# Patient Record
(demographics unavailable — no encounter records)

---

## 2025-01-08 NOTE — PHYSICAL EXAM
[Normal] : Gait: normal [Ballesteros's Sign] : negative Ballesteros's sign [Pronator Drift] : negative pronator drift [SLR] : negative straight leg raise [de-identified] : 5 out of 5 motor strength, sensation is intact and symmetrical full range of motion flexion extension and rotation, no palpatory tenderness full range of motion of hips knees shoulders and elbows (all four extremities), no atrophy, negative straight leg raise, no pathological reflexes, no swelling, normal ambulation, no apparent distress skin is intact, no palpable lymph nodes, no upper or lower extremity instability, alert and oriented x3 and normal mood. Normal finger-to nose test.   [de-identified] : XR AP Lat Lumbar 01/08/2025 - s/p L3-S1 laminectomy and L4-5 fusion -reviewed with patient.    LS SPINE XRAY WITH OBLIQUES   10/22/24  (Chely Ferris)  HISTORY: R26.2 Difficulty Walking M54.42 Lower back pain with left sciatica Frontal, lateral, oblique views of the lumbar spine are submitted. There is a posterior fusion and laminectomy at L4-L5 stabilized by bilateral rods and pedicle screws. Hardware is intact without evidence of loosening. There is a trace retrolisthesis and mild disc space narrowing at L4-L5. There are surgical clips in right upper quadrant. Vertebral body height is preserved. No aggressive appearing lytic or blastic osseous lesions are identified. There is limbus vertebra at L4. IMPRESSION: Status post posterior fusion and laminectomy at L4-L5. Hardware is intact without evidence of loosening. Trace retrolisthesis and mild disc space narrowing at L4-L5. If there are persistent symptoms MRI or CT can be obtained for further evaluation as clinically warranted.   XR AP Lat Lumbar 08/14/2024-L4-5 laminectomy and fusion-reviewed with patient.   XR AP Lat Cervical 05/09/2024 - facet joint arthritis -reviewed with patient.   XR AP Lat Lumbar 05/09/2024 - L3-S1 laminectomy and L4-5 fusion, adequate -reviewed with patient.    XR AP Lat Lumbar 03/06/2024 -L3-S1 laminectomy and L4-5 fusion, adequate- reviewed with patient.   XR AP Lat Lumbar 02/05/2024 -L3-S1 laminectomy and L4-5 fusion, adequate- reviewed with patient.

## 2025-01-08 NOTE — HISTORY OF PRESENT ILLNESS
[Improving] : improving [de-identified] : 56 year old female presents s/p L3-S1 laminectomy and L4-5 fusion on 1/23/24.  Complains of right lower back pain with certain movements and positions. States that her pre-operative leg pain has resolved.  Fell backwards 2 days ago onto back. Was referred to PT for the lumbar spine which she states has been helpful.  She complains of neck popping with forward flexion. She also had a fall on her head against a coffee table in April Has pain at base of the head. Takes tylenol for the pain.  She was referred to PT for the cervical spine at last visit. She went to the chiropractor instead, and had 1-2 visits since May, and she states she feels improvement.  Has been having increased falls due to issues with balance, is going to see a neurologist this week. She notes that she has increased back pain due to the fall, and has pain radiating down the LLE laterally to the foot.  No fever chills sweats nausea vomiting no bowel or bladder dysfunction, no recent weight loss or gain no night pain. This history is in addition to the intake form that I personally reviewed.

## 2025-01-08 NOTE — ADDENDUM
[FreeTextEntry1] : This note was written by Romana Desir on 01/08/2025 acting as scribe for Dr. Shmuel Chua M.D.  I, Shmuel Chua MD, have read and attest that all the information, medical decision making and discharge instructions within are true and accurate.

## 2025-01-08 NOTE — DISCUSSION/SUMMARY
[de-identified] : s/p L3-S1 laminectomy and L4-5 fusion on 1/23/24. Doing well, is happy with surgical results. Cervical facet joint arthritis. Lumbar sprain and strain. Discussed all options. Will see her neurologist Dr. Jurgen Sarah. Parkinson's. Referral for physical therapy. All options discussed including rest, medicine, home exercise, acupuncture, Chiropractic care, Physical Therapy, Pain management, and last resort surgery. All questions were answered, all alternatives discussed, and the patient is in complete agreement with the treatment plan which the patient contributed to and discussed with me through the shared decision-making process. Follow-up appointment as instructed. Any issues and the patient will call or come in sooner.  agrees with plan.

## 2025-05-22 NOTE — ADDENDUM
[FreeTextEntry1] : This note was written by Alexsander Whittington on 05/22/2025 acting as scribe for Dr. Shmuel Chua M.D.  I, Shmuel Chua MD, have read and attest that all the information, medical decision making and discharge instructions within are true and accurate.

## 2025-05-22 NOTE — DISCUSSION/SUMMARY
[de-identified] : s/p L3-S1 laminectomy and L4-5 fusion on 1/23/24. Cervical facet joint arthritis. Mid thoracic compression fracture- fell in March but had multiple falls after. Will see her neurologist Dr. Jurgen Sarah. Parkinson's. Discussed all options. Naproxen PRN. Thoracic MRI referral to assess compression fracture. F/U after MRI. All options discussed including rest, medicine, home exercise, acupuncture, Chiropractic care, Physical Therapy, Pain management, and last resort surgery. All questions were answered, all alternatives discussed, and the patient is in complete agreement with the treatment plan which the patient contributed to and discussed with me through the shared decision-making process. Follow-up appointment as instructed. Any issues and the patient will call or come in sooner. Family member agrees with plan.

## 2025-05-22 NOTE — HISTORY OF PRESENT ILLNESS
[Improving] : improving [de-identified] : 56 year old female presents s/p L3-S1 laminectomy and L4-5 fusion on 1/23/24.  Complains of right lower back pain with certain movements and positions. States that her pre-operative leg pain has resolved.  Was referred to PT for the lumbar spine which she states has been helpful.  She complains of neck popping with forward flexion. She also had a fall on her head against a coffee table in April Has pain at base of the head. Takes tylenol for the pain.  She was referred to PT for the cervical spine at last visit. She went to the chiropractor instead, and had 1-2 visits since May, and she states she feels improvement.  Has been having increased falls due to issues with balance, is seeing neurology.  She notes that she has increased back pain due to the fall, and has pain radiating down the LLE laterally to the foot.  She notes that she had a fall in March where she fell backwards and hit her head. She has been having mid and upper back pain since the fall. She went to [facility] and notes that she had MRI of her back done which she reports was negative. She notes that her pain has been persistent since the fall.  Ambulating with a rolling walker.  No fever chills sweats nausea vomiting no bowel or bladder dysfunction, no recent weight loss or gain no night pain. This history is in addition to the intake form that I personally reviewed.

## 2025-05-22 NOTE — PHYSICAL EXAM
[Walker] : ambulates with walker [Ballesteros's Sign] : negative Ballesteros's sign [Pronator Drift] : negative pronator drift [SLR] : negative straight leg raise [de-identified] : 5 out of 5 motor strength, sensation is intact and symmetrical full range of motion flexion extension and rotation, no palpatory tenderness full range of motion of hips knees shoulders and elbows (all four extremities), no atrophy, negative straight leg raise, no pathological reflexes, no swelling, normal ambulation, no apparent distress skin is intact, no palpable lymph nodes, no upper or lower extremity instability, alert and oriented x3 and normal mood. Normal finger-to nose test. no percussion tenderness.   [de-identified] : XR AP Lat Thoracic 05/22/2025 -mid thoracic compression fracture, mildly increased kyphosis- reviewed with patient.    XR AP Lat Lumbar 01/08/2025 - s/p L3-S1 laminectomy and L4-5 fusion -reviewed with patient.    LS SPINE XRAY WITH OBLIQUES   10/22/24  (Chely Ferris)  HISTORY: R26.2 Difficulty Walking M54.42 Lower back pain with left sciatica Frontal, lateral, oblique views of the lumbar spine are submitted. There is a posterior fusion and laminectomy at L4-L5 stabilized by bilateral rods and pedicle screws. Hardware is intact without evidence of loosening. There is a trace retrolisthesis and mild disc space narrowing at L4-L5. There are surgical clips in right upper quadrant. Vertebral body height is preserved. No aggressive appearing lytic or blastic osseous lesions are identified. There is limbus vertebra at L4. IMPRESSION: Status post posterior fusion and laminectomy at L4-L5. Hardware is intact without evidence of loosening. Trace retrolisthesis and mild disc space narrowing at L4-L5. If there are persistent symptoms MRI or CT can be obtained for further evaluation as clinically warranted.   XR AP Lat Lumbar 08/14/2024-L4-5 laminectomy and fusion-reviewed with patient.   XR AP Lat Cervical 05/09/2024 - facet joint arthritis -reviewed with patient.   XR AP Lat Lumbar 05/09/2024 - L3-S1 laminectomy and L4-5 fusion, adequate -reviewed with patient.    XR AP Lat Lumbar 03/06/2024 -L3-S1 laminectomy and L4-5 fusion, adequate- reviewed with patient.   XR AP Lat Lumbar 02/05/2024 -L3-S1 laminectomy and L4-5 fusion, adequate- reviewed with patient.

## 2025-07-09 NOTE — ADDENDUM
[FreeTextEntry1] : This note was written by Hakeem Castro on 07/09/2025 acting as scribe for Dr. Shmuel Chua M.D. I, Shmuel Chua MD, have read and attest that all the information, medical decision making and discharge instructions within are true and accurate.                       g

## 2025-07-09 NOTE — DISCUSSION/SUMMARY
[de-identified] : s/p L3-S1 laminectomy and L4-5 fusion on 1/23/24. Cervical facet joint arthritis. Mid thoracic compression fracture- fell in March but had multiple falls after. Will see her neurologist Dr. Jurgen Sarah. Parkinson's. Fell down the stairs 3/2025 T7 Subacute compression fractures. T4,8,9,10 compression fractures healing. Discussed all options. Gabapentin QHS. Medrol dosepak PRN. Referral for physical therapy in 1 month. All options discussed including rest, medicine, home exercise, acupuncture, Chiropractic care, Physical Therapy, Pain management, and last resort surgery. All questions were answered, all alternatives discussed, and the patient is in complete agreement with the treatment plan which the patient contributed to and discussed with me through the shared decision-making process. Follow-up appointment as instructed. Any issues and the patient will call or come in sooner. Family member agrees with plan.

## 2025-07-09 NOTE — HISTORY OF PRESENT ILLNESS
[Improving] : improving [de-identified] : 56 year old female presents s/p L3-S1 laminectomy and L4-5 fusion on 1/23/24.  Complains of right lower back pain with certain movements and positions. States that her pre-operative leg pain has resolved.  Was referred to PT for the lumbar spine which she states has been helpful.  She complains of neck popping with forward flexion. She also had a fall on her head against a coffee table in April Has pain at base of the head. Takes tylenol for the pain.  She was referred to PT for the cervical spine at last visit. She went to the chiropractor instead, and had 1-2 visits since May, and she states she feels improvement.  Has been having increased falls due to issues with balance, is seeing neurology.  She notes that she has increased back pain due to the fall, and has pain radiating down the LLE laterally to the foot.  She notes that she had a fall in March where she fell backwards and hit her head. She has been having mid and upper back pain since the fall. She went to [facility] and notes that she had MRI of her back done which she reports was negative. She notes that her pain has been persistent since the fall.  Presents today for MRI Thoracic review.  Ambulating with a rolling walker.  No fever chills sweats nausea vomiting no bowel or bladder dysfunction, no recent weight loss or gain no night pain. This history is in addition to the intake form that I personally reviewed.

## 2025-07-09 NOTE — PHYSICAL EXAM
[Walker] : ambulates with walker [Ballesteros's Sign] : negative Ballesteros's sign [Pronator Drift] : negative pronator drift [SLR] : negative straight leg raise [de-identified] : 5 out of 5 motor strength, sensation is intact and symmetrical full range of motion flexion extension and rotation, no palpatory tenderness full range of motion of hips knees shoulders and elbows (all four extremities), no atrophy, negative straight leg raise, no pathological reflexes, no swelling, normal ambulation, no apparent distress skin is intact, no palpable lymph nodes, no upper or lower extremity instability, alert and oriented x3 and normal mood. Normal finger-to nose test. no percussion tenderness.   [de-identified] : 06/05/2025 EXAM: MRI-THORACIC SPINE NON CONTRAST   (Chely Ferris)  HISTORY: M62.830 Spasm of back muscles M54.6 Thoracic Pain COMPARISON: Thoracic radiculopathy TECHNIQUE: Multiplanar, multisequence MRI of the thoracic spine without contrast was performed without administration of intravenous contrast. FINDINGS: Alignment: Alignment is preserved. 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 Middle and posterior columns are intact. No retropulsion of bone fragments. No epidural or paravertebral hematoma. Chronic anterior wedge compression fracture of T4, T8, T9 and T10. Degenerative disc disease involving thoracic spine without evidence of thoracic disc herniation or thoracic spinal stenosis or thoracic cord compression. The neural foramina patent throughout thoracic spine. Thoracic cord is normal. No intramedullary lesion. Paravertebral soft tissues are normal. Mild kyphotic deformity centered in the midthoracic spine. IMPRESSION: 1. 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 2. Chronic anterior wedge compression fractures of T4, T8, T9 and T10. 3. Degenerative disc disease involving the thoracic spine without evidence of thoracic disc herniation or thoracic spinal stenosis or thoracic cord compression.    XR AP Lat Thoracic 05/22/2025 -mid thoracic compression fracture, mildly increased kyphosis- reviewed with patient.    XR AP Lat Lumbar 01/08/2025 - s/p L3-S1 laminectomy and L4-5 fusion -reviewed with patient.    LS SPINE XRAY WITH OBLIQUES   10/22/24  (Chely Ferris)  HISTORY: R26.2 Difficulty Walking M54.42 Lower back pain with left sciatica Frontal, lateral, oblique views of the lumbar spine are submitted. There is a posterior fusion and laminectomy at L4-L5 stabilized by bilateral rods and pedicle screws. Hardware is intact without evidence of loosening. There is a trace retrolisthesis and mild disc space narrowing at L4-L5. There are surgical clips in right upper quadrant. Vertebral body height is preserved. No aggressive appearing lytic or blastic osseous lesions are identified. There is limbus vertebra at L4. IMPRESSION: Status post posterior fusion and laminectomy at L4-L5. Hardware is intact without evidence of loosening. Trace retrolisthesis and mild disc space narrowing at L4-L5. If there are persistent symptoms MRI or CT can be obtained for further evaluation as clinically warranted.   XR AP Lat Lumbar 08/14/2024-L4-5 laminectomy and fusion-reviewed with patient.   XR AP Lat Cervical 05/09/2024 - facet joint arthritis -reviewed with patient.   XR AP Lat Lumbar 05/09/2024 - L3-S1 laminectomy and L4-5 fusion, adequate -reviewed with patient.    XR AP Lat Lumbar 03/06/2024 -L3-S1 laminectomy and L4-5 fusion, adequate- reviewed with patient.   XR AP Lat Lumbar 02/05/2024 -L3-S1 laminectomy and L4-5 fusion, adequate- reviewed with patient.